# Patient Record
Sex: FEMALE | Race: WHITE | NOT HISPANIC OR LATINO | Employment: OTHER | URBAN - METROPOLITAN AREA
[De-identification: names, ages, dates, MRNs, and addresses within clinical notes are randomized per-mention and may not be internally consistent; named-entity substitution may affect disease eponyms.]

---

## 2020-07-30 ENCOUNTER — OFFICE VISIT (OUTPATIENT)
Dept: CARDIOLOGY | Facility: CLINIC | Age: 75
End: 2020-07-30

## 2020-07-30 VITALS
BODY MASS INDEX: 24.91 KG/M2 | DIASTOLIC BLOOD PRESSURE: 55 MMHG | HEART RATE: 82 BPM | SYSTOLIC BLOOD PRESSURE: 98 MMHG | HEIGHT: 66 IN | OXYGEN SATURATION: 100 % | WEIGHT: 155 LBS

## 2020-07-30 DIAGNOSIS — E11.9 TYPE 2 DIABETES MELLITUS WITHOUT COMPLICATION, WITHOUT LONG-TERM CURRENT USE OF INSULIN (HCC): ICD-10-CM

## 2020-07-30 DIAGNOSIS — I25.10 CORONARY ARTERY DISEASE INVOLVING NATIVE CORONARY ARTERY OF NATIVE HEART WITHOUT ANGINA PECTORIS: Primary | ICD-10-CM

## 2020-07-30 DIAGNOSIS — E78.2 MIXED HYPERLIPIDEMIA: ICD-10-CM

## 2020-07-30 PROCEDURE — 99204 OFFICE O/P NEW MOD 45 MIN: CPT | Performed by: INTERNAL MEDICINE

## 2020-07-31 RX ORDER — NITROGLYCERIN 0.4 MG/1
0.4 TABLET SUBLINGUAL
COMMUNITY

## 2020-07-31 RX ORDER — PAROXETINE 30 MG/1
30 TABLET, FILM COATED ORAL EVERY MORNING
COMMUNITY
End: 2020-08-24 | Stop reason: SDUPTHER

## 2020-07-31 RX ORDER — ROSUVASTATIN CALCIUM 20 MG/1
40 TABLET, COATED ORAL DAILY
COMMUNITY

## 2020-07-31 RX ORDER — CARVEDILOL 12.5 MG/1
12.5 TABLET ORAL 3 TIMES DAILY
COMMUNITY
End: 2020-10-07 | Stop reason: SDUPTHER

## 2020-07-31 RX ORDER — CLOPIDOGREL BISULFATE 75 MG/1
75 TABLET ORAL DAILY
COMMUNITY
End: 2020-10-07 | Stop reason: SDUPTHER

## 2020-07-31 RX ORDER — OXYBUTYNIN CHLORIDE 5 MG/1
10 TABLET ORAL 2 TIMES DAILY
COMMUNITY
End: 2020-08-27 | Stop reason: ALTCHOICE

## 2020-08-04 ENCOUNTER — OFFICE VISIT (OUTPATIENT)
Dept: FAMILY MEDICINE CLINIC | Facility: CLINIC | Age: 75
End: 2020-08-04

## 2020-08-04 VITALS
HEIGHT: 66 IN | WEIGHT: 155 LBS | BODY MASS INDEX: 24.91 KG/M2 | HEART RATE: 80 BPM | SYSTOLIC BLOOD PRESSURE: 97 MMHG | DIASTOLIC BLOOD PRESSURE: 56 MMHG | OXYGEN SATURATION: 97 % | TEMPERATURE: 97.1 F

## 2020-08-04 DIAGNOSIS — L08.9 TOE INFECTION: ICD-10-CM

## 2020-08-04 DIAGNOSIS — E11.42 TYPE 2 DIABETES MELLITUS WITH DIABETIC POLYNEUROPATHY, WITHOUT LONG-TERM CURRENT USE OF INSULIN (HCC): ICD-10-CM

## 2020-08-04 DIAGNOSIS — E78.2 MIXED HYPERLIPIDEMIA: Primary | ICD-10-CM

## 2020-08-04 DIAGNOSIS — Z12.31 OTHER SCREENING MAMMOGRAM: ICD-10-CM

## 2020-08-04 DIAGNOSIS — Z12.11 SCREEN FOR COLON CANCER: ICD-10-CM

## 2020-08-04 DIAGNOSIS — E03.9 ACQUIRED HYPOTHYROIDISM: ICD-10-CM

## 2020-08-04 DIAGNOSIS — R32 URINARY INCONTINENCE, UNSPECIFIED TYPE: ICD-10-CM

## 2020-08-04 DIAGNOSIS — Z00.00 PREVENTATIVE HEALTH CARE: ICD-10-CM

## 2020-08-04 PROBLEM — F41.8 DEPRESSION WITH ANXIETY: Status: ACTIVE | Noted: 2020-08-04

## 2020-08-04 PROBLEM — Z87.442 HISTORY OF KIDNEY STONES: Status: ACTIVE | Noted: 2020-08-04

## 2020-08-04 PROCEDURE — 99204 OFFICE O/P NEW MOD 45 MIN: CPT | Performed by: NURSE PRACTITIONER

## 2020-08-04 RX ORDER — ALENDRONATE SODIUM 70 MG/1
70 TABLET ORAL
Qty: 4 TABLET | Refills: 6 | Status: SHIPPED | OUTPATIENT
Start: 2020-08-04 | End: 2020-09-14 | Stop reason: SDUPTHER

## 2020-08-04 NOTE — PATIENT INSTRUCTIONS
Fasting blood work  Keep appointment for colonoscopy and mammogram  Take Fosamax as directed  Monitor blood sugars  Bladder spasm medicine as directed  Take antibiotic as directed with food until gone  Keep appointment with podiatry  Follow-up 1 month

## 2020-08-04 NOTE — PROGRESS NOTES
Edin Brown is a 74 y.o. female.     74-year-old white female with history of CAD/CABG/angioplasty, depression anxiety, type 2 diabetes, hyperlipidemia, kidney stones, hypothyroidism, and urinary incontinence who comes in today to establish as new patient.  Patient currently has moved here from [place] is living with her daughter  Her main complaint today is right greater toe is very red and oozing discharge.  I am placing her on clindamycin and sending her to podiatry.  All her toenails need to be addressed  Blood pressure low at 98/56 heart rate 80 with murmur she has been established with cardiology she saw  recently and he was not sure what was causing her pressure to run low however he did not decrease any of her medications  Patient states sugars usually average less than 130 fasting in the morning and we will be getting blood work  Does have a history of depression anxiety which is very well controlled and patient states she has no issues with either at this time  She does complain of lower thoracic pain that radiates around to the front of her abdomen that comes and goes.  She states she has had thoracic spine series done that did not show any issues.  It is possible may be a kidney stone moving  Weight 155  I gave patient some samples of Myrbetriq to try for her urinary incontinence and we may do urology referral down the road  Patient states she had a recent DEXA scan which showed osteopenia she is going to try to find out exactly when it was I am placing her on Fosamax and calcium vitamin D.  I am also scheduling her colonoscopy and mammogram and she is to schedule an eye exam    Podiatry referral  Clindamycin 3 mg twice daily x10 days  Mammogram/colonoscopy  Schedule eye exam  Fasting blood work  Myrbetriq 50 mg daily samples  Follow-up 1 month       The following portions of the patient's history were reviewed and updated as appropriate: allergies, current medications, past family  "history, past medical history, past social history, past surgical history and problem list.    Vitals:    08/04/20 1516   BP: 97/56   BP Location: Right arm   Patient Position: Sitting   Cuff Size: Adult   Pulse: 80   Temp: 97.1 °F (36.2 °C)   TempSrc: Temporal   SpO2: 97%   Weight: 70.3 kg (155 lb)   Height: 167.6 cm (66\")     Body mass index is 25.02 kg/m².    Past Medical History:   Diagnosis Date   • Coronary artery disease    • Diabetes mellitus (CMS/[facility])    • Mixed hyperlipidemia 7/30/2020     Past Surgical History:   Procedure Laterality Date   • CARDIAC CATHETERIZATION     • CORONARY ARTERY BYPASS GRAFT     • CORONARY STENT PLACEMENT     • DILATATION AND CURETTAGE     • HYSTERECTOMY     • TONSILLECTOMY     • TONSILLECTOMY       Family History   Problem Relation Age of Onset   • Diabetes Mother    • No Known Problems Father    • No Known Problems Sister    • Heart attack Brother    • Heart disease Brother    • Heart failure Brother    • No Known Problems Maternal Aunt    • No Known Problems Maternal Uncle    • No Known Problems Paternal Aunt    • No Known Problems Paternal Uncle    • No Known Problems Maternal Grandmother    • No Known Problems Maternal Grandfather    • No Known Problems Paternal Grandmother    • No Known Problems Paternal Grandfather    • No Known Problems Other    • Anemia Neg Hx    • Arrhythmia Neg Hx    • Asthma Neg Hx    • Clotting disorder Neg Hx    • Fainting Neg Hx    • Hyperlipidemia Neg Hx    • Hypertension Neg Hx        There is no immunization history on file for this patient.    No results found for any previous visit.         Review of Systems   Constitutional: Negative.    HENT: Negative.    Respiratory: Negative.    Cardiovascular: Negative.    Gastrointestinal: Negative.    Genitourinary: Positive for urinary incontinence.   Musculoskeletal:        Right greater toe pain   Psychiatric/Behavioral: Negative.        Objective   Physical Exam   Constitutional: She is oriented to " person, place, and time. She appears well-developed and well-nourished.   Cardiovascular: Normal rate and regular rhythm.   Murmur heard.  Pulmonary/Chest: Effort normal and breath sounds normal.   Abdominal: Soft. Bowel sounds are normal.   Musculoskeletal: Normal range of motion.   Right greater toe red swollen and oozing drainage and very sore   Neurological: She is alert and oriented to person, place, and time.   Skin: Skin is warm and dry.   Psychiatric: She has a normal mood and affect.       Procedures    Assessment/Plan   Edin was seen today for toe pain.    Diagnoses and all orders for this visit:    Mixed hyperlipidemia  -     Lipid Panel With LDL / HDL Ratio    Acquired hypothyroidism  -     TSH+Free T4  -     T3    Type 2 diabetes mellitus with diabetic polyneuropathy, without long-term current use of insulin (CMS/[facility])  -     Comprehensive Metabolic Panel  -     Hemoglobin A1c    Preventative health care  -     CBC & Differential    Screen for colon cancer  -     Amb referral for Screening Colonoscopy    Toe infection    Urinary incontinence, unspecified type    BMI 25.0-25.9,adult    Other orders  -     alendronate (FOSAMAX) 70 MG tablet; Take 1 tablet by mouth Every 7 (Seven) Days.          Current Outpatient Medications:   •  carvedilol (COREG) 12.5 MG tablet, Take 12.5 mg by mouth 2 (Two) Times a Day With Meals., Disp: , Rfl:   •  clopidogrel (PLAVIX) 75 MG tablet, Take 75 mg by mouth Daily., Disp: , Rfl:   •  insulin detemir (LEVEMIR) 100 UNIT/ML injection, Inject 20 Units under the skin into the appropriate area as directed Daily., Disp: , Rfl:   •  metFORMIN (GLUCOPHAGE) 1000 MG tablet, Take 1,000 mg by mouth 2 (Two) Times a Day With Meals., Disp: , Rfl:   •  nitroglycerin (NITROSTAT) 0.4 MG SL tablet, Place 0.4 mg under the tongue Every 5 (Five) Minutes As Needed for Chest Pain. Take no more than 3 doses in 15 minutes., Disp: , Rfl:   •  oxybutynin (DITROPAN) 5 MG tablet, Take 10 mg by mouth 2  (Two) Times a Day., Disp: , Rfl:   •  PARoxetine (PAXIL) 30 MG tablet, Take 30 mg by mouth Every Morning., Disp: , Rfl:   •  rosuvastatin (CRESTOR) 20 MG tablet, Take 40 mg by mouth Daily., Disp: , Rfl:   •  alendronate (FOSAMAX) 70 MG tablet, Take 1 tablet by mouth Every 7 (Seven) Days., Disp: 4 tablet, Rfl: 6

## 2020-08-05 NOTE — ADDENDUM NOTE
Addended by: STEFANIA LEDESMA on: 8/5/2020 04:04 PM     Modules accepted: Orders     Spl[place] 275 mg/1.1 m    Administered : left deltoid  JJ  EXP: 2022  NDC: 89548-005-68  Patient tolerated well

## 2020-08-06 ENCOUNTER — TELEPHONE (OUTPATIENT)
Dept: FAMILY MEDICINE CLINIC | Facility: CLINIC | Age: 75
End: 2020-08-06

## 2020-08-06 NOTE — TELEPHONE ENCOUNTER
Pt called confused on the instructions for Myrbetriq 50 mg.  Pt says she is struggling to read the instructions.

## 2020-08-07 LAB
ALBUMIN SERPL-MCNC: 3.8 G/DL (ref 3.7–4.7)
ALBUMIN/GLOB SERPL: 1.7 {RATIO} (ref 1.2–2.2)
ALP SERPL-CCNC: 126 IU/L (ref 39–117)
ALT SERPL-CCNC: 10 IU/L (ref 0–32)
AST SERPL-CCNC: 20 IU/L (ref 0–40)
BASOPHILS # BLD AUTO: 0 X10E3/UL (ref 0–0.2)
BASOPHILS NFR BLD AUTO: 1 %
BILIRUB SERPL-MCNC: <0.2 MG/DL (ref 0–1.2)
BUN SERPL-MCNC: 25 MG/DL (ref 8–27)
BUN/CREAT SERPL: 29 (ref 12–28)
CALCIUM SERPL-MCNC: 9.1 MG/DL (ref 8.7–10.3)
CHLORIDE SERPL-SCNC: 106 MMOL/L (ref 96–106)
CHOLEST SERPL-MCNC: 99 MG/DL (ref 100–199)
CO2 SERPL-SCNC: 28 MMOL/L (ref 20–29)
CREAT SERPL-MCNC: 0.87 MG/DL (ref 0.57–1)
EOSINOPHIL # BLD AUTO: 0.2 X10E3/UL (ref 0–0.4)
EOSINOPHIL NFR BLD AUTO: 4 %
ERYTHROCYTE [DISTWIDTH] IN BLOOD BY AUTOMATED COUNT: 13.6 % (ref 11.7–15.4)
GLOBULIN SER CALC-MCNC: 2.3 G/DL (ref 1.5–4.5)
GLUCOSE SERPL-MCNC: 103 MG/DL (ref 65–99)
HBA1C MFR BLD: 6.6 % (ref 4.8–5.6)
HCT VFR BLD AUTO: 31.9 % (ref 34–46.6)
HDLC SERPL-MCNC: 34 MG/DL
HGB BLD-MCNC: 10 G/DL (ref 11.1–15.9)
IMM GRANULOCYTES # BLD AUTO: 0 X10E3/UL (ref 0–0.1)
IMM GRANULOCYTES NFR BLD AUTO: 0 %
LDLC SERPL CALC-MCNC: 42 MG/DL (ref 0–99)
LDLC/HDLC SERPL: 1.2 RATIO (ref 0–3.2)
LYMPHOCYTES # BLD AUTO: 1.2 X10E3/UL (ref 0.7–3.1)
LYMPHOCYTES NFR BLD AUTO: 22 %
MCH RBC QN AUTO: 27.9 PG (ref 26.6–33)
MCHC RBC AUTO-ENTMCNC: 31.3 G/DL (ref 31.5–35.7)
MCV RBC AUTO: 89 FL (ref 79–97)
MONOCYTES # BLD AUTO: 0.5 X10E3/UL (ref 0.1–0.9)
MONOCYTES NFR BLD AUTO: 9 %
NEUTROPHILS # BLD AUTO: 3.5 X10E3/UL (ref 1.4–7)
NEUTROPHILS NFR BLD AUTO: 64 %
PLATELET # BLD AUTO: 116 X10E3/UL (ref 150–450)
POTASSIUM SERPL-SCNC: 4.1 MMOL/L (ref 3.5–5.2)
PROT SERPL-MCNC: 6.1 G/DL (ref 6–8.5)
RBC # BLD AUTO: 3.59 X10E6/UL (ref 3.77–5.28)
SODIUM SERPL-SCNC: 144 MMOL/L (ref 134–144)
T3 SERPL-MCNC: 95 NG/DL (ref 71–180)
T4 FREE SERPL-MCNC: 1.07 NG/DL (ref 0.82–1.77)
TRIGL SERPL-MCNC: 115 MG/DL (ref 0–149)
TSH SERPL DL<=0.005 MIU/L-ACNC: 4.89 UIU/ML (ref 0.45–4.5)
VLDLC SERPL CALC-MCNC: 23 MG/DL (ref 5–40)
WBC # BLD AUTO: 5.5 X10E3/UL (ref 3.4–10.8)

## 2020-08-10 ENCOUNTER — OFFICE VISIT (OUTPATIENT)
Dept: FAMILY MEDICINE CLINIC | Facility: CLINIC | Age: 75
End: 2020-08-10

## 2020-08-10 VITALS
HEART RATE: 89 BPM | WEIGHT: 154 LBS | HEIGHT: 66 IN | OXYGEN SATURATION: 97 % | TEMPERATURE: 97.6 F | SYSTOLIC BLOOD PRESSURE: 97 MMHG | DIASTOLIC BLOOD PRESSURE: 60 MMHG | BODY MASS INDEX: 24.75 KG/M2

## 2020-08-10 DIAGNOSIS — R10.9 FLANK PAIN: ICD-10-CM

## 2020-08-10 DIAGNOSIS — D50.9 IRON DEFICIENCY ANEMIA, UNSPECIFIED IRON DEFICIENCY ANEMIA TYPE: ICD-10-CM

## 2020-08-10 DIAGNOSIS — E11.43 TYPE 2 DIABETES MELLITUS WITH DIABETIC AUTONOMIC NEUROPATHY, WITH LONG-TERM CURRENT USE OF INSULIN (HCC): ICD-10-CM

## 2020-08-10 DIAGNOSIS — Z87.442 HISTORY OF KIDNEY STONES: ICD-10-CM

## 2020-08-10 DIAGNOSIS — Z79.4 TYPE 2 DIABETES MELLITUS WITH DIABETIC AUTONOMIC NEUROPATHY, WITH LONG-TERM CURRENT USE OF INSULIN (HCC): ICD-10-CM

## 2020-08-10 DIAGNOSIS — E03.9 HYPOTHYROIDISM, UNSPECIFIED TYPE: ICD-10-CM

## 2020-08-10 DIAGNOSIS — E78.2 MIXED HYPERLIPIDEMIA: ICD-10-CM

## 2020-08-10 DIAGNOSIS — N39.41 URGE INCONTINENCE OF URINE: Primary | ICD-10-CM

## 2020-08-10 LAB
BILIRUB BLD-MCNC: NEGATIVE MG/DL
CLARITY, POC: CLEAR
COLOR UR: YELLOW
GLUCOSE UR STRIP-MCNC: ABNORMAL MG/DL
KETONES UR QL: NEGATIVE
LEUKOCYTE EST, POC: NEGATIVE
NITRITE UR-MCNC: NEGATIVE MG/ML
PH UR: 6 [PH] (ref 5–8)
PROT UR STRIP-MCNC: ABNORMAL MG/DL
RBC # UR STRIP: NEGATIVE /UL
SP GR UR: 1.01 (ref 1–1.03)
UROBILINOGEN UR QL: NORMAL

## 2020-08-10 PROCEDURE — 99214 OFFICE O/P EST MOD 30 MIN: CPT | Performed by: NURSE PRACTITIONER

## 2020-08-10 PROCEDURE — 81003 URINALYSIS AUTO W/O SCOPE: CPT | Performed by: NURSE PRACTITIONER

## 2020-08-10 RX ORDER — FERROUS SULFATE 324(65)MG
324 TABLET, DELAYED RELEASE (ENTERIC COATED) ORAL
Qty: 30 TABLET | Refills: 6 | Status: SHIPPED | OUTPATIENT
Start: 2020-08-10

## 2020-08-10 RX ORDER — PEN NEEDLE, DIABETIC 30 GX3/16"
1 NEEDLE, DISPOSABLE MISCELLANEOUS DAILY
Qty: 50 EACH | Refills: 5 | Status: SHIPPED | OUTPATIENT
Start: 2020-08-10 | End: 2020-09-28

## 2020-08-10 RX ORDER — LEVOTHYROXINE SODIUM 0.05 MG/1
50 TABLET ORAL DAILY
Qty: 30 TABLET | Refills: 2 | Status: SHIPPED | OUTPATIENT
Start: 2020-08-10 | End: 2020-09-14 | Stop reason: SDUPTHER

## 2020-08-10 RX ORDER — LEVOTHYROXINE SODIUM 0.03 MG/1
25 TABLET ORAL DAILY
Qty: 30 TABLET | Refills: 2 | Status: SHIPPED | OUTPATIENT
Start: 2020-08-10 | End: 2020-08-10 | Stop reason: ALTCHOICE

## 2020-08-10 RX ORDER — GLIMEPIRIDE 2 MG/1
2 TABLET ORAL
Qty: 60 TABLET | Refills: 2 | Status: SHIPPED | OUTPATIENT
Start: 2020-08-10 | End: 2020-10-07 | Stop reason: SDUPTHER

## 2020-08-10 NOTE — PATIENT INSTRUCTIONS
Stop Levemir  Amaryl 2 mg in the morning along with metformin call in blood sugars to office  Ferrous sulfate 325 mg daily  Keep appointment for CT  Take increased dose of Synthroid as directed  Repeat fasting blood work 3 months

## 2020-08-10 NOTE — PROGRESS NOTES
"    Edin Brown is a 74 y.o. female.     74-year-old white female with history of CAD/CABG/angioplasty, depression anxiety, type 2 diabetes, hyperlipidemia, kidney stones, hypothyroidism, urinary incontinence who comes in today for follow-up visit to go over blood work  Patient's thyroid level was elevated we are going to increase her Synthroid to 50mcg and recheck levels in 3 months  She was also anemic at 10.4 but  states she has not had anemia for a long time but she is aware of put her on iron supplements and we will recheck her CBC with her thyroid levels in 3 months  Patient sugars normally average less than 120 in the morning she is on Levemir and metformin.  She would like to get off the Levemir because it is expensive I am going to switch her over to Amaryl and metformin and will see what her sugars do off of the Levemir  Blood pressure 98/60 heart rate 88 she denies any chest pain, dyspnea, tachycardia or dizziness  Patient still having episodes starting in the left flank radiating around to the front.  She does have a history of stones and states that they were checking her gallbladder in [place] before she left that showed no gallstones.  I am going to get a CT stone protocol her urine today did not have blood    CT stone protocol  Ferrous sulfate 325 mg daily  Amaryl 2 mg daily along with metformin  Stop Levemir  Increase Synthroid to 50mcg daily  Repeat fasting blood work 3 months       The following portions of the patient's history were reviewed and updated as appropriate: allergies, current medications, past family history, past medical history, past social history, past surgical history and problem list.    Vitals:    08/10/20 1511   BP: 97/60   BP Location: Right arm   Patient Position: Sitting   Cuff Size: Adult   Pulse: 89   Temp: 97.6 °F (36.4 °C)   TempSrc: Temporal   SpO2: 97%   Weight: 69.9 kg (154 lb)   Height: 167.6 cm (66\")     Body mass index is 24.86 kg/m².    Past Medical " History:   Diagnosis Date   • Coronary artery disease    • Diabetes mellitus (CMS/[facility])    • Mixed hyperlipidemia 7/30/2020     Past Surgical History:   Procedure Laterality Date   • CARDIAC CATHETERIZATION     • CORONARY ARTERY BYPASS GRAFT     • CORONARY STENT PLACEMENT     • DILATATION AND CURETTAGE     • HYSTERECTOMY     • TONSILLECTOMY     • TONSILLECTOMY       Family History   Problem Relation Age of Onset   • Diabetes Mother    • No Known Problems Father    • No Known Problems Sister    • Heart attack Brother    • Heart disease Brother    • Heart failure Brother    • No Known Problems Maternal Aunt    • No Known Problems Maternal Uncle    • No Known Problems Paternal Aunt    • No Known Problems Paternal Uncle    • No Known Problems Maternal Grandmother    • No Known Problems Maternal Grandfather    • No Known Problems Paternal Grandmother    • No Known Problems Paternal Grandfather    • No Known Problems Other    • Anemia Neg Hx    • Arrhythmia Neg Hx    • Asthma Neg Hx    • Clotting disorder Neg Hx    • Fainting Neg Hx    • Hyperlipidemia Neg Hx    • Hypertension Neg Hx        There is no immunization history on file for this patient.    Office Visit on 08/04/2020   Component Date Value Ref Range Status   • WBC 08/06/2020 5.5  3.4 - 10.8 x10E3/uL Final   • RBC 08/06/2020 3.59* 3.77 - 5.28 x10E6/uL Final   • Hemoglobin 08/06/2020 10.0* 11.1 - 15.9 g/dL Final   • Hematocrit 08/06/2020 31.9* 34.0 - 46.6 % Final   • MCV 08/06/2020 89  79 - 97 fL Final   • MCH 08/06/2020 27.9  26.6 - 33.0 pg Final   • MCHC 08/06/2020 31.3* 31.5 - 35.7 g/dL Final   • RDW 08/06/2020 13.6  11.7 - 15.4 % Final   • Platelets 08/06/2020 116* 150 - 450 x10E3/uL Final   • Neutrophil Rel % 08/06/2020 64  Not Estab. % Final   • Lymphocyte Rel % 08/06/2020 22  Not Estab. % Final   • Monocyte Rel % 08/06/2020 9  Not Estab. % Final   • Eosinophil Rel % 08/06/2020 4  Not Estab. % Final   • Basophil Rel % 08/06/2020 1  Not Estab. % Final   •  Neutrophils Absolute 08/06/2020 3.5  1.4 - 7.0 x10E3/uL Final   • Lymphocytes Absolute 08/06/2020 1.2  0.7 - 3.1 x10E3/uL Final   • Monocytes Absolute 08/06/2020 0.5  0.1 - 0.9 x10E3/uL Final   • Eosinophils Absolute 08/06/2020 0.2  0.0 - 0.4 x10E3/uL Final   • Basophils Absolute 08/06/2020 0.0  0.0 - 0.2 x10E3/uL Final   • Immature Granulocyte Rel % 08/06/2020 0  Not Estab. % Final   • Immature Grans Absolute 08/06/2020 0.0  0.0 - 0.1 x10E3/uL Final   • Glucose 08/06/2020 103* 65 - 99 mg/dL Final   • BUN 08/06/2020 25  8 - 27 mg/dL Final   • Creatinine 08/06/2020 0.87  0.57 - 1.00 mg/dL Final   • eGFR Non  Am 08/06/2020 66  >59 mL/min/1.73 Final   • eGFR African Am 08/06/2020 76  >59 mL/min/1.73 Final   • BUN/Creatinine Ratio 08/06/2020 29* 12 - 28 Final   • Sodium 08/06/2020 144  134 - 144 mmol/L Final   • Potassium 08/06/2020 4.1  3.5 - 5.2 mmol/L Final   • Chloride 08/06/2020 106  96 - 106 mmol/L Final   • Total CO2 08/06/2020 28  20 - 29 mmol/L Final   • Calcium 08/06/2020 9.1  8.7 - 10.3 mg/dL Final   • Total Protein 08/06/2020 6.1  6.0 - 8.5 g/dL Final   • Albumin 08/06/2020 3.8  3.7 - 4.7 g/dL Final   • Globulin 08/06/2020 2.3  1.5 - 4.5 g/dL Final   • A/G Ratio 08/06/2020 1.7  1.2 - 2.2 Final   • Total Bilirubin 08/06/2020 <0.2  0.0 - 1.2 mg/dL Final   • Alkaline Phosphatase 08/06/2020 126* 39 - 117 IU/L Final   • AST (SGOT) 08/06/2020 20  0 - 40 IU/L Final   • ALT (SGPT) 08/06/2020 10  0 - 32 IU/L Final   • Total Cholesterol 08/06/2020 99* 100 - 199 mg/dL Final   • Triglycerides 08/06/2020 115  0 - 149 mg/dL Final   • HDL Cholesterol 08/06/2020 34* >39 mg/dL Final   • VLDL Cholesterol 08/06/2020 23  5 - 40 mg/dL Final   • LDL Cholesterol  08/06/2020 42  0 - 99 mg/dL Final   • LDL/HDL Ratio 08/06/2020 1.2  0.0 - 3.2 ratio Final    Comment:                                     LDL/HDL Ratio                                              Men  Women                                1/2 Avg.Risk  1.0    1.5                                     Avg.Risk  3.6    3.2                                 2X Avg.Risk  6.2    5.0                                 3X Avg.Risk  8.0    6.1     • Hemoglobin A1C 08/06/2020 6.6* 4.8 - 5.6 % Final    Comment:          Prediabetes: 5.7 - 6.4           Diabetes: >6.4           Glycemic control for adults with diabetes: <7.0     • TSH 08/06/2020 4.890* 0.450 - 4.500 uIU/mL Final   • Free T4 08/06/2020 1.07  0.82 - 1.77 ng/dL Final   • T3, Total 08/06/2020 95  71 - 180 ng/dL Final         Review of Systems   Constitutional: Negative.    HENT: Negative.    Respiratory: Negative.    Cardiovascular: Negative.    Gastrointestinal: Positive for abdominal pain.   Genitourinary: Positive for flank pain.   Skin: Negative.    Neurological: Negative.    Psychiatric/Behavioral: Negative.        Objective   Physical Exam   Constitutional: She is oriented to person, place, and time. She appears well-developed and well-nourished.   Cardiovascular: Normal rate and regular rhythm.   Pulmonary/Chest: Effort normal and breath sounds normal.   Abdominal: Soft. Bowel sounds are normal.   Musculoskeletal: Normal range of motion.   Neurological: She is alert and oriented to person, place, and time.   Skin: Skin is warm and dry.   Psychiatric: She has a normal mood and affect.       Procedures    Assessment/Plan   Edin was seen today for urinary tract infection and toe pain.    Diagnoses and all orders for this visit:    Urge incontinence of urine  -     POCT urinalysis dipstick, automated    Mixed hyperlipidemia    Iron deficiency anemia, unspecified iron deficiency anemia type  -     ferrous sulfate 324 MG tablet delayed-release; Take 1 tablet by mouth Daily With Breakfast.    Hypothyroidism, unspecified type  -     levothyroxine (Synthroid) 50 MCG tablet; Take 1 tablet by mouth Daily.    Type 2 diabetes mellitus with diabetic autonomic neuropathy, with long-term current use of insulin (CMS/[facility])  -     glimepiride  (AMARYL) 2 MG tablet; Take 1 tablet by mouth Every Morning Before Breakfast.    History of kidney stones    BMI 24.0-24.9, adult    Other orders  -     Mirabegron ER (MYRBETRIQ) 50 MG tablet sustained-release 24 hour 24 hr tablet; Take 50 mg by mouth Daily.  -     Insulin Pen Needle (PEN NEEDLES) 32G X 4 MM misc; 1 each Daily. Use as directed with insulin          Current Outpatient Medications:   •  alendronate (FOSAMAX) 70 MG tablet, Take 1 tablet by mouth Every 7 (Seven) Days., Disp: 4 tablet, Rfl: 6  •  carvedilol (COREG) 12.5 MG tablet, Take 12.5 mg by mouth 2 (Two) Times a Day With Meals., Disp: , Rfl:   •  clopidogrel (PLAVIX) 75 MG tablet, Take 75 mg by mouth Daily., Disp: , Rfl:   •  insulin detemir (LEVEMIR) 100 UNIT/ML injection, Inject 20 Units under the skin into the appropriate area as directed Daily., Disp: , Rfl:   •  metFORMIN (GLUCOPHAGE) 1000 MG tablet, Take 1,000 mg by mouth 2 (Two) Times a Day With Meals., Disp: , Rfl:   •  nitroglycerin (NITROSTAT) 0.4 MG SL tablet, Place 0.4 mg under the tongue Every 5 (Five) Minutes As Needed for Chest Pain. Take no more than 3 doses in 15 minutes., Disp: , Rfl:   •  oxybutynin (DITROPAN) 5 MG tablet, Take 10 mg by mouth 2 (Two) Times a Day., Disp: , Rfl:   •  PARoxetine (PAXIL) 30 MG tablet, Take 30 mg by mouth Every Morning., Disp: , Rfl:   •  rosuvastatin (CRESTOR) 20 MG tablet, Take 40 mg by mouth Daily., Disp: , Rfl:   •  ferrous sulfate 324 MG tablet delayed-release, Take 1 tablet by mouth Daily With Breakfast., Disp: 30 tablet, Rfl: 6  •  glimepiride (AMARYL) 2 MG tablet, Take 1 tablet by mouth Every Morning Before Breakfast., Disp: 60 tablet, Rfl: 2  •  Insulin Pen Needle (PEN NEEDLES) 32G X 4 MM misc, 1 each Daily. Use as directed with insulin, Disp: 50 each, Rfl: 5  •  levothyroxine (Synthroid) 50 MCG tablet, Take 1 tablet by mouth Daily., Disp: 30 tablet, Rfl: 2  •  Mirabegron ER (MYRBETRIQ) 50 MG tablet sustained-release 24 hour 24 hr tablet, Take  50 mg by mouth Daily., Disp: 30 tablet, Rfl: 6

## 2020-08-11 ENCOUNTER — TELEPHONE (OUTPATIENT)
Dept: FAMILY MEDICINE CLINIC | Facility: CLINIC | Age: 75
End: 2020-08-11

## 2020-08-11 NOTE — TELEPHONE ENCOUNTER
Patient reported her last pneumonia vaccine was in 2/2015 with Dr. Kincaid in [facility].  She is believing she needs a second vaccine.  Please call patient and let her know.

## 2020-08-18 DIAGNOSIS — R10.9 FLANK PAIN: ICD-10-CM

## 2020-08-18 DIAGNOSIS — Z87.442 HISTORY OF KIDNEY STONES: ICD-10-CM

## 2020-08-20 ENCOUNTER — TELEPHONE (OUTPATIENT)
Dept: FAMILY MEDICINE CLINIC | Facility: CLINIC | Age: 75
End: 2020-08-20

## 2020-08-20 NOTE — TELEPHONE ENCOUNTER
It is amaryl and it was called in. She is to stop levamir and start amaryl then calll in blood pressures

## 2020-08-20 NOTE — TELEPHONE ENCOUNTER
Patient called and stated you was changing her from Levemir to a different diabetes medication, patient stated will be out of Levemir today and wanted to know if you can go ahead and send in the new medication to [facility] in [place]

## 2020-08-24 ENCOUNTER — TELEPHONE (OUTPATIENT)
Dept: FAMILY MEDICINE CLINIC | Facility: CLINIC | Age: 75
End: 2020-08-24

## 2020-08-24 DIAGNOSIS — Z12.31 OTHER SCREENING MAMMOGRAM: ICD-10-CM

## 2020-08-24 DIAGNOSIS — I25.10 CORONARY ARTERY DISEASE INVOLVING NATIVE CORONARY ARTERY OF NATIVE HEART WITHOUT ANGINA PECTORIS: Primary | ICD-10-CM

## 2020-08-24 RX ORDER — PAROXETINE 30 MG/1
30 TABLET, FILM COATED ORAL EVERY MORNING
Qty: 90 TABLET | Refills: 3 | Status: SHIPPED | OUTPATIENT
Start: 2020-08-24

## 2020-08-24 NOTE — TELEPHONE ENCOUNTER
Pt called and states she doesn't want to see cardio Dr. Ramirez. She wants to see who you recommend. It it ok to order new referral and send to whoever you wish? Please advise.

## 2020-08-27 ENCOUNTER — OFFICE VISIT (OUTPATIENT)
Dept: FAMILY MEDICINE CLINIC | Facility: CLINIC | Age: 75
End: 2020-08-27

## 2020-08-27 ENCOUNTER — TELEPHONE (OUTPATIENT)
Dept: FAMILY MEDICINE CLINIC | Facility: CLINIC | Age: 75
End: 2020-08-27

## 2020-08-27 VITALS
OXYGEN SATURATION: 100 % | HEIGHT: 66 IN | HEART RATE: 83 BPM | SYSTOLIC BLOOD PRESSURE: 93 MMHG | TEMPERATURE: 96.8 F | WEIGHT: 157 LBS | BODY MASS INDEX: 25.23 KG/M2 | DIASTOLIC BLOOD PRESSURE: 56 MMHG

## 2020-08-27 DIAGNOSIS — N39.46 MIXED STRESS AND URGE URINARY INCONTINENCE: ICD-10-CM

## 2020-08-27 DIAGNOSIS — Z79.4 TYPE 2 DIABETES MELLITUS WITH DIABETIC AUTONOMIC NEUROPATHY, WITH LONG-TERM CURRENT USE OF INSULIN (HCC): Primary | ICD-10-CM

## 2020-08-27 DIAGNOSIS — I95.9 HYPOTENSION, UNSPECIFIED HYPOTENSION TYPE: ICD-10-CM

## 2020-08-27 DIAGNOSIS — E11.43 TYPE 2 DIABETES MELLITUS WITH DIABETIC AUTONOMIC NEUROPATHY, WITH LONG-TERM CURRENT USE OF INSULIN (HCC): Primary | ICD-10-CM

## 2020-08-27 LAB — GLUCOSE BLDC GLUCOMTR-MCNC: 187 MG/DL (ref 70–130)

## 2020-08-27 PROCEDURE — 99214 OFFICE O/P EST MOD 30 MIN: CPT | Performed by: NURSE PRACTITIONER

## 2020-08-27 PROCEDURE — 82962 GLUCOSE BLOOD TEST: CPT | Performed by: NURSE PRACTITIONER

## 2020-08-27 RX ORDER — DIPHENHYDRAMINE HYDROCHLORIDE 25 MG/1
CAPSULE, LIQUID FILLED ORAL
Qty: 1 EACH | Refills: 0 | Status: SHIPPED | OUTPATIENT
Start: 2020-08-27 | End: 2020-09-01 | Stop reason: SDUPTHER

## 2020-08-27 RX ORDER — OXYBUTYNIN CHLORIDE 5 MG/1
5 TABLET ORAL 2 TIMES DAILY
Qty: 60 TABLET | Refills: 0 | Status: SHIPPED | OUTPATIENT
Start: 2020-08-27 | End: 2020-09-28

## 2020-08-27 RX ORDER — LANCETS 30 GAUGE
1 EACH MISCELLANEOUS 3 TIMES DAILY
Qty: 100 EACH | Refills: 11 | Status: SHIPPED | OUTPATIENT
Start: 2020-08-27 | End: 2020-09-01 | Stop reason: SDUPTHER

## 2020-08-27 NOTE — PATIENT INSTRUCTIONS
Keep appointment with urologist  Monitor blood sugars and  new glucometer  Keep appointment with Dr. Cai  Follow-up CT abdomen in 6 months  Follow-up visit 3 months

## 2020-08-27 NOTE — PROGRESS NOTES
"    Edin Brown is a 74 y.o. female.     74-year-old white female with history of CAD/CABG/angioplasty, depression anxiety, type 2 diabetes, hyperlipidemia, kidney stones, hypothyroidism, urinary incontinence who comes in today for follow-up visit.  Patient states she went to the ER after her glucometer told her sugar was 800.  She states she did not feel any differently.  When she got to the ER her sugar was around 200.  Most glucometers will not read higher than 4 500 so I am thinking she probably read the meter wrong or it has malfunctioned.  I am ordering her a new glucometer.  She had a large breakfast this morning and states her sugar was in the 200s fasting before she ate breakfast.  Her fingerstick this morning was 183  Patient had asked to be switched to a different cardiologist and she has upcoming appointment with Dr. Cai.  Her blood pressures are always in the 90s  systolic but she is not symptomatic.  Blood pressure today 94/56 heart rate 82  Patient has overactive bladder and I placed her on Myrbetriq which has helped however we are going to refer her to urology she thinks her bladder has fallen  Weight is up 3 pounds at 157.  Patient CT of the abdomen showed some distention of aorta recommended 6-month follow-up    Keep appointment with cardiology  Neurology referral  Pickup new glucometer at [facility]  Follow-up 3 months         The following portions of the patient's history were reviewed and updated as appropriate: allergies, current medications, past family history, past medical history, past social history, past surgical history and problem list.    Vitals:    08/27/20 0909   BP: 93/56   BP Location: Right arm   Patient Position: Sitting   Cuff Size: Adult   Pulse: 83   Temp: 96.8 °F (36 °C)   TempSrc: Infrared   SpO2: 100%   Weight: 71.2 kg (157 lb)   Height: 167.6 cm (66\")     Body mass index is 25.34 kg/m².    Past Medical History:   Diagnosis Date   • Coronary artery disease    • Diabetes " mellitus (CMS/HCC)    • Mixed hyperlipidemia 7/30/2020     Past Surgical History:   Procedure Laterality Date   • CARDIAC CATHETERIZATION     • CORONARY ARTERY BYPASS GRAFT     • CORONARY STENT PLACEMENT     • DILATATION AND CURETTAGE     • HYSTERECTOMY     • TONSILLECTOMY     • TONSILLECTOMY       Family History   Problem Relation Age of Onset   • Diabetes Mother    • No Known Problems Father    • No Known Problems Sister    • Heart attack Brother    • Heart disease Brother    • Heart failure Brother    • No Known Problems Maternal Aunt    • No Known Problems Maternal Uncle    • No Known Problems Paternal Aunt    • No Known Problems Paternal Uncle    • No Known Problems Maternal Grandmother    • No Known Problems Maternal Grandfather    • No Known Problems Paternal Grandmother    • No Known Problems Paternal Grandfather    • No Known Problems Other    • Anemia Neg Hx    • Arrhythmia Neg Hx    • Asthma Neg Hx    • Clotting disorder Neg Hx    • Fainting Neg Hx    • Hyperlipidemia Neg Hx    • Hypertension Neg Hx        There is no immunization history on file for this patient.    Office Visit on 08/10/2020   Component Date Value Ref Range Status   • Color 08/10/2020 Yellow  Yellow, Straw, Dark Yellow, Ximena Final   • Clarity, UA 08/10/2020 Clear  Clear Final   • Specific Gravity  08/10/2020 1.015  1.005 - 1.030 Final   • pH, Urine 08/10/2020 6.0  5.0 - 8.0 Final   • Leukocytes 08/10/2020 Negative  Negative Final   • Nitrite, UA 08/10/2020 Negative  Negative Final   • Protein, POC 08/10/2020 Trace* Negative mg/dL Final   • Glucose, UA 08/10/2020 3+* Negative, 1000 mg/dL (3+) mg/dL Final   • Ketones, UA 08/10/2020 Negative  Negative Final   • Urobilinogen, UA 08/10/2020 Normal  Normal Final   • Bilirubin 08/10/2020 Negative  Negative Final   • Blood, UA 08/10/2020 Negative  Negative Final         Review of Systems   HENT: Negative.    Respiratory: Negative.    Cardiovascular: Negative.    Gastrointestinal: Negative.     Genitourinary: Negative.    Musculoskeletal: Negative.    Skin: Negative.    Neurological: Negative.    Psychiatric/Behavioral: Negative.        Objective   Physical Exam   Constitutional: She is oriented to person, place, and time. She appears well-developed and well-nourished.   Cardiovascular: Normal rate and regular rhythm.   Pulmonary/Chest: Effort normal and breath sounds normal.   Abdominal: Soft. Bowel sounds are normal.   Musculoskeletal: Normal range of motion.   Neurological: She is alert and oriented to person, place, and time.   Skin: Skin is warm and dry.   Psychiatric: She has a normal mood and affect.       Procedures    Assessment/Plan   Problems Addressed this Visit     None      Visit Diagnoses     Type 2 diabetes mellitus with diabetic autonomic neuropathy, with long-term current use of insulin (CMS/[facility])    -  Primary    Relevant Orders    POCT Glucose (Completed)    BMI 25.0-25.9,adult        Hypotension, unspecified hypotension type        Mixed stress and urge urinary incontinence        Relevant Orders    Ambulatory Referral to Urology (Completed)            Current Outpatient Medications:   •  alendronate (FOSAMAX) 70 MG tablet, Take 1 tablet by mouth Every 7 (Seven) Days., Disp: 4 tablet, Rfl: 6  •  carvedilol (COREG) 12.5 MG tablet, Take 12.5 mg by mouth 2 (Two) Times a Day With Meals., Disp: , Rfl:   •  clopidogrel (PLAVIX) 75 MG tablet, Take 75 mg by mouth Daily., Disp: , Rfl:   •  ferrous sulfate 324 MG tablet delayed-release, Take 1 tablet by mouth Daily With Breakfast., Disp: 30 tablet, Rfl: 6  •  glimepiride (AMARYL) 2 MG tablet, Take 1 tablet by mouth Every Morning Before Breakfast., Disp: 60 tablet, Rfl: 2  •  insulin detemir (LEVEMIR) 100 UNIT/ML injection, Inject 20 Units under the skin into the appropriate area as directed Daily., Disp: , Rfl:   •  Insulin Pen Needle (PEN NEEDLES) 32G X 4 MM misc, 1 each Daily. Use as directed with insulin, Disp: 50 each, Rfl: 5  •   levothyroxine (Synthroid) 50 MCG tablet, Take 1 tablet by mouth Daily., Disp: 30 tablet, Rfl: 2  •  metFORMIN (GLUCOPHAGE) 1000 MG tablet, Take 1,000 mg by mouth 2 (Two) Times a Day With Meals., Disp: , Rfl:   •  nitroglycerin (NITROSTAT) 0.4 MG SL tablet, Place 0.4 mg under the tongue Every 5 (Five) Minutes As Needed for Chest Pain. Take no more than 3 doses in 15 minutes., Disp: , Rfl:   •  oxybutynin (DITROPAN) 5 MG tablet, Take 1 tablet by mouth 2 (Two) Times a Day., Disp: 60 tablet, Rfl: 0  •  PARoxetine (PAXIL) 30 MG tablet, Take 1 tablet by mouth Every Morning., Disp: 90 tablet, Rfl: 3  •  rosuvastatin (CRESTOR) 20 MG tablet, Take 40 mg by mouth Daily., Disp: , Rfl:

## 2020-08-28 ENCOUNTER — TELEPHONE (OUTPATIENT)
Dept: FAMILY MEDICINE CLINIC | Facility: CLINIC | Age: 75
End: 2020-08-28

## 2020-09-01 RX ORDER — LANCETS 30 GAUGE
1 EACH MISCELLANEOUS 3 TIMES DAILY
Qty: 100 EACH | Refills: 11 | Status: SHIPPED | OUTPATIENT
Start: 2020-09-01 | End: 2020-09-08 | Stop reason: SDUPTHER

## 2020-09-01 RX ORDER — DIPHENHYDRAMINE HYDROCHLORIDE 25 MG/1
CAPSULE, LIQUID FILLED ORAL
Qty: 1 EACH | Refills: 0 | Status: SHIPPED | OUTPATIENT
Start: 2020-09-01 | End: 2020-09-08 | Stop reason: SDUPTHER

## 2020-09-03 ENCOUNTER — OFFICE VISIT (OUTPATIENT)
Dept: FAMILY MEDICINE CLINIC | Facility: CLINIC | Age: 75
End: 2020-09-03

## 2020-09-03 VITALS
DIASTOLIC BLOOD PRESSURE: 53 MMHG | TEMPERATURE: 99.3 F | HEIGHT: 66 IN | BODY MASS INDEX: 25.23 KG/M2 | SYSTOLIC BLOOD PRESSURE: 88 MMHG | OXYGEN SATURATION: 96 % | HEART RATE: 81 BPM | WEIGHT: 157 LBS

## 2020-09-03 DIAGNOSIS — E11.43 TYPE 2 DIABETES MELLITUS WITH DIABETIC AUTONOMIC NEUROPATHY, WITH LONG-TERM CURRENT USE OF INSULIN (HCC): ICD-10-CM

## 2020-09-03 DIAGNOSIS — I95.2 HYPOTENSION DUE TO DRUGS: Primary | ICD-10-CM

## 2020-09-03 DIAGNOSIS — Z79.4 TYPE 2 DIABETES MELLITUS WITH DIABETIC AUTONOMIC NEUROPATHY, WITH LONG-TERM CURRENT USE OF INSULIN (HCC): ICD-10-CM

## 2020-09-03 PROCEDURE — 99213 OFFICE O/P EST LOW 20 MIN: CPT | Performed by: NURSE PRACTITIONER

## 2020-09-03 RX ORDER — MIDODRINE HYDROCHLORIDE 5 MG/1
5 TABLET ORAL
Qty: 30 TABLET | Refills: 2 | Status: SHIPPED | OUTPATIENT
Start: 2020-09-03 | End: 2020-09-28

## 2020-09-03 NOTE — PROGRESS NOTES
"    Edin Brown is a 74 y.o. female.     74-year-old white female with history of CAD/CABG/angioplasty, depression, anxiety, type 2 diabetes, hyperlipidemia, kidney stones, hypothyroidism, urinary incontinence who comes in today for follow-up visit  Patient blood pressure still low at 88/52 heart rate 82 and she complains of weakness and some dizziness with position changes.  The only medicine she is on that can be causing this is carvedilol 12.5 twice a day however I hate to stop that or decrease it due to her atrial fibrillation.  I am going to put her on 5 mg of midodrine and she is going to try to get her systolic to be greater than 100 and see if she feels better.  She has an upcoming appointment with Dr. Cai in a couple of weeks  Weight is unchanged at 157 her sugars are averaging usually less than 150 in the morning depending on what she eats    Midodrine 5mg daily  Monitor blood pressure closely try to keep systolic above 100  Keep appointment with cardiology       The following portions of the patient's history were reviewed and updated as appropriate: allergies, current medications, past family history, past medical history, past social history, past surgical history and problem list.    Vitals:    09/03/20 1338   BP: (!) 88/53   BP Location: Right arm   Patient Position: Sitting   Cuff Size: Adult   Pulse: 81   Temp: 99.3 °F (37.4 °C)   TempSrc: Infrared   SpO2: 96%   Weight: 71.2 kg (157 lb)   Height: 167.6 cm (66\")     Body mass index is 25.34 kg/m².    Past Medical History:   Diagnosis Date   • Coronary artery disease    • Diabetes mellitus (CMS/[facility])    • Mixed hyperlipidemia 7/30/2020     Past Surgical History:   Procedure Laterality Date   • CARDIAC CATHETERIZATION     • CORONARY ARTERY BYPASS GRAFT     • CORONARY STENT PLACEMENT     • DILATATION AND CURETTAGE     • HYSTERECTOMY     • TONSILLECTOMY     • TONSILLECTOMY       Family History   Problem Relation Age of Onset   • Diabetes Mother    • " No Known Problems Father    • No Known Problems Sister    • Heart attack Brother    • Heart disease Brother    • Heart failure Brother    • No Known Problems Maternal Aunt    • No Known Problems Maternal Uncle    • No Known Problems Paternal Aunt    • No Known Problems Paternal Uncle    • No Known Problems Maternal Grandmother    • No Known Problems Maternal Grandfather    • No Known Problems Paternal Grandmother    • No Known Problems Paternal Grandfather    • No Known Problems Other    • Anemia Neg Hx    • Arrhythmia Neg Hx    • Asthma Neg Hx    • Clotting disorder Neg Hx    • Fainting Neg Hx    • Hyperlipidemia Neg Hx    • Hypertension Neg Hx        There is no immunization history on file for this patient.    Office Visit on 08/27/2020   Component Date Value Ref Range Status   • Glucose 08/27/2020 187* 70 - 130 mg/dL Final         Review of Systems   HENT: Negative.    Respiratory: Negative.    Cardiovascular: Negative.    Gastrointestinal: Negative.    Genitourinary: Negative.    Musculoskeletal: Negative.    Skin: Negative.    Neurological: Positive for dizziness and weakness.   Psychiatric/Behavioral: Negative.        Objective   Physical Exam   Constitutional: She is oriented to person, place, and time. She appears well-developed and well-nourished.   Cardiovascular: Normal rate and regular rhythm.   Pulmonary/Chest: Effort normal and breath sounds normal.   Abdominal: Soft. Bowel sounds are normal.   Musculoskeletal: Normal range of motion.   Neurological: She is alert and oriented to person, place, and time.   Skin: Skin is warm and dry.   Psychiatric: She has a normal mood and affect.   Mild dementia       Procedures    Assessment/Plan   Problems Addressed this Visit     None      Visit Diagnoses     Hypotension due to drugs    -  Primary    BMI 25.0-25.9,adult        Type 2 diabetes mellitus with diabetic autonomic neuropathy, with long-term current use of insulin (CMS/[facility])                Current  Outpatient Medications:   •  alendronate (FOSAMAX) 70 MG tablet, Take 1 tablet by mouth Every 7 (Seven) Days., Disp: 4 tablet, Rfl: 6  •  Blood Glucose Monitoring Suppl (BLOOD GLUCOSE MONITOR SYSTEM) w/Device kit, Check blood sugars tid, Disp: 1 each, Rfl: 0  •  carvedilol (COREG) 12.5 MG tablet, Take 12.5 mg by mouth 2 (Two) Times a Day With Meals., Disp: , Rfl:   •  clopidogrel (PLAVIX) 75 MG tablet, Take 75 mg by mouth Daily., Disp: , Rfl:   •  ferrous sulfate 324 MG tablet delayed-release, Take 1 tablet by mouth Daily With Breakfast., Disp: 30 tablet, Rfl: 6  •  glimepiride (AMARYL) 2 MG tablet, Take 1 tablet by mouth Every Morning Before Breakfast., Disp: 60 tablet, Rfl: 2  •  glucose blood test strip, Check blood sugar tid, Disp: 100 each, Rfl: 12  •  Insulin Pen Needle (PEN NEEDLES) 32G X 4 MM misc, 1 each Daily. Use as directed with insulin, Disp: 50 each, Rfl: 5  •  Lancets misc, 1 applicator 3 (Three) Times a Day., Disp: 100 each, Rfl: 11  •  levothyroxine (Synthroid) 50 MCG tablet, Take 1 tablet by mouth Daily., Disp: 30 tablet, Rfl: 2  •  metFORMIN (GLUCOPHAGE) 1000 MG tablet, Take 1,000 mg by mouth Daily., Disp: , Rfl:   •  nitroglycerin (NITROSTAT) 0.4 MG SL tablet, Place 0.4 mg under the tongue Every 5 (Five) Minutes As Needed for Chest Pain. Take no more than 3 doses in 15 minutes., Disp: , Rfl:   •  oxybutynin (DITROPAN) 5 MG tablet, Take 1 tablet by mouth 2 (Two) Times a Day., Disp: 60 tablet, Rfl: 0  •  PARoxetine (PAXIL) 30 MG tablet, Take 1 tablet by mouth Every Morning., Disp: 90 tablet, Rfl: 3  •  rosuvastatin (CRESTOR) 20 MG tablet, Take 40 mg by mouth Daily., Disp: , Rfl:   •  insulin detemir (LEVEMIR) 100 UNIT/ML injection, Inject 20 Units under the skin into the appropriate area as directed Daily., Disp: , Rfl:   •  midodrine (PROAMATINE) 5 MG tablet, Take 1 tablet by mouth 3 (Three) Times a Day Before Meals., Disp: 30 tablet, Rfl: 2

## 2020-09-03 NOTE — PATIENT INSTRUCTIONS
Take Midodrine 5mg as directed  Monitor blood pressure closely try to keep systolic greater than 100  Monitor blood sugars and watch your diet  Keep appointment with cardiology

## 2020-09-08 PROBLEM — E11.9 TYPE 2 DIABETES MELLITUS WITHOUT COMPLICATION, WITHOUT LONG-TERM CURRENT USE OF INSULIN (HCC): Status: ACTIVE | Noted: 2020-09-08

## 2020-09-08 RX ORDER — LANCETS 30 GAUGE
EACH MISCELLANEOUS
Qty: 100 EACH | Refills: 11 | Status: SHIPPED | OUTPATIENT
Start: 2020-09-08

## 2020-09-08 RX ORDER — DIPHENHYDRAMINE HYDROCHLORIDE 25 MG/1
CAPSULE, LIQUID FILLED ORAL
Qty: 1 EACH | Refills: 0 | Status: SHIPPED | OUTPATIENT
Start: 2020-09-08

## 2020-09-14 DIAGNOSIS — E03.9 HYPOTHYROIDISM, UNSPECIFIED TYPE: ICD-10-CM

## 2020-09-14 RX ORDER — ALENDRONATE SODIUM 70 MG/1
70 TABLET ORAL
Qty: 4 TABLET | Refills: 6 | Status: SHIPPED | OUTPATIENT
Start: 2020-09-14 | End: 2020-10-03 | Stop reason: SDUPTHER

## 2020-09-14 RX ORDER — LEVOTHYROXINE SODIUM 0.05 MG/1
50 TABLET ORAL DAILY
Qty: 30 TABLET | Refills: 2 | Status: SHIPPED | OUTPATIENT
Start: 2020-09-14

## 2020-09-16 ENCOUNTER — TELEPHONE (OUTPATIENT)
Dept: FAMILY MEDICINE CLINIC | Facility: CLINIC | Age: 75
End: 2020-09-16

## 2020-09-16 NOTE — TELEPHONE ENCOUNTER
Patient called said that she has had black stool 4 to 5 days now doesn't think its going away. Also mentioned on the voicemail that she has an appointment with a cardiologist but doesn't know when, I looked in her referrals and doesn't have a time and date anywhere

## 2020-09-17 NOTE — TELEPHONE ENCOUNTER
Referral for Trell was made couple of visits ago you may not have been here then need to make sure she gets in to see him  Need occult blood stool on her

## 2020-09-28 ENCOUNTER — OFFICE VISIT (OUTPATIENT)
Dept: CARDIOLOGY | Facility: CLINIC | Age: 75
End: 2020-09-28

## 2020-09-28 VITALS
WEIGHT: 158 LBS | DIASTOLIC BLOOD PRESSURE: 56 MMHG | OXYGEN SATURATION: 98 % | HEART RATE: 75 BPM | BODY MASS INDEX: 25.39 KG/M2 | HEIGHT: 66 IN | SYSTOLIC BLOOD PRESSURE: 104 MMHG

## 2020-09-28 DIAGNOSIS — I95.89 CHRONIC HYPOTENSION: ICD-10-CM

## 2020-09-28 DIAGNOSIS — I25.10 CORONARY ARTERY DISEASE INVOLVING NATIVE CORONARY ARTERY OF NATIVE HEART WITHOUT ANGINA PECTORIS: Primary | ICD-10-CM

## 2020-09-28 DIAGNOSIS — E11.9 TYPE 2 DIABETES MELLITUS WITHOUT COMPLICATION, WITHOUT LONG-TERM CURRENT USE OF INSULIN (HCC): ICD-10-CM

## 2020-09-28 DIAGNOSIS — E78.2 MIXED HYPERLIPIDEMIA: ICD-10-CM

## 2020-09-28 PROCEDURE — 99214 OFFICE O/P EST MOD 30 MIN: CPT | Performed by: INTERNAL MEDICINE

## 2020-09-28 RX ORDER — VITAMIN B COMPLEX
1 CAPSULE ORAL DAILY
COMMUNITY

## 2020-09-28 RX ORDER — ASPIRIN 81 MG/1
81 TABLET ORAL DAILY
COMMUNITY

## 2020-09-28 RX ORDER — OXYBUTYNIN CHLORIDE 5 MG/1
TABLET ORAL
Qty: 60 TABLET | Refills: 0 | Status: SHIPPED | OUTPATIENT
Start: 2020-09-28

## 2020-09-28 RX ORDER — SENNOSIDES 8.6 MG
650 CAPSULE ORAL EVERY 8 HOURS PRN
COMMUNITY

## 2020-09-28 NOTE — PROGRESS NOTES
Date of Office Visit: 2020  Encounter Provider: Dr. Hebert Cai    Place of Service: [facility] CARDIOLOGY [place]  Patient Name: Edin Brown  :1945  Neelima Redd APRN    Chief Complaint   Patient presents with   • Consult   • Coronary Artery Disease  CABG  Status post coronary artery stenting  Diabetes mellitus type 2  Hypotension     History of Present Illness:    I am pleased to see Mrs. Brown in my office today as a follow-up.    As you know, patient is 75-year-old white female whose past medical history significant for hyperlipidemia, diabetes mellitus, CAD, history of coronary artery stenting, CABG, who came to establish her cardiac care with me.    In , patient underwent CABG at [facility].  Patient subsequently moved to [place].  And most of her cardiac care was over there.  Recently she moved to [place] again.  And she wants to establish her cardiac care with me.    In , patient was visiting [place] and had unstable angina.  Patient underwent cardiac catheterization and subsequent stenting was required.  In 2020, she was admitted in [facility] and had non-ST elevation myocardial infarction.  Patient underwent cardiac catheterization and subsequent stenting.  Records of all these procedures are not available to me.    Patient is fairly active.  She lives independently.  Patient denies any symptom of chest pain or tightness or heaviness.  Patient does have mild shortness of breath.  No leg edema noted.  No orthopnea or PND.  No sign or symptom of congestive heart failure.    At present patient is stable from coronary artery standpoint.  No active symptom of coronary insufficiency or angina pectoris.  However her blood pressure is noted to be low.  Patient complained of occasional dizzy spell.  I advised her to monitor the blood pressure at home morning and evening and bring the logbook to me on next visit.  I will see the patient  relatively sooner in 3 months.  If she has persistently low blood pressure, I would consider midodrine.  Current treatment would be continued.        Past Medical History:   Diagnosis Date   • Coronary artery disease    • Diabetes mellitus (CMS/HCC)    • Mixed hyperlipidemia 7/30/2020         Past Surgical History:   Procedure Laterality Date   • CARDIAC CATHETERIZATION     • CORONARY ARTERY BYPASS GRAFT     • CORONARY STENT PLACEMENT     • DILATATION AND CURETTAGE     • HYSTERECTOMY     • TONSILLECTOMY     • TONSILLECTOMY             Current Outpatient Medications:   •  acetaminophen (TYLENOL) 650 MG 8 hr tablet, Take 650 mg by mouth Every 8 (Eight) Hours As Needed for Mild Pain ., Disp: , Rfl:   •  alendronate (FOSAMAX) 70 MG tablet, Take 1 tablet by mouth Every 7 (Seven) Days., Disp: 4 tablet, Rfl: 6  •  aspirin 81 MG EC tablet, Take 81 mg by mouth Daily., Disp: , Rfl:   •  B Complex Vitamins (vitamin b complex) capsule capsule, Take 1 capsule by mouth Daily., Disp: , Rfl:   •  Blood Glucose Monitoring Suppl (BLOOD GLUCOSE MONITOR SYSTEM) w/Device kit, Check BS 3 times a day DX Code E11.9, Disp: 1 each, Rfl: 0  •  carvedilol (COREG) 12.5 MG tablet, Take 12.5 mg by mouth 3 (Three) Times a Day., Disp: , Rfl:   •  Cholecalciferol (Vitamin D-3) 125 MCG (5000 UT) tablet, Take  by mouth., Disp: , Rfl:   •  clopidogrel (PLAVIX) 75 MG tablet, Take 75 mg by mouth Daily., Disp: , Rfl:   •  ferrous sulfate 324 MG tablet delayed-release, Take 1 tablet by mouth Daily With Breakfast., Disp: 30 tablet, Rfl: 6  •  glimepiride (AMARYL) 2 MG tablet, Take 1 tablet by mouth Every Morning Before Breakfast., Disp: 60 tablet, Rfl: 2  •  glucose blood test strip, Check BS 3 times a day DX Code E11.9, Disp: 100 each, Rfl: 12  •  Lancets misc, Check BS 3 times a day DX Code E11.9, Disp: 100 each, Rfl: 11  •  metFORMIN (GLUCOPHAGE) 1000 MG tablet, Take 1,000 mg by mouth Daily With Breakfast., Disp: , Rfl:   •  nitroglycerin (NITROSTAT) 0.4  MG SL tablet, Place 0.4 mg under the tongue Every 5 (Five) Minutes As Needed for Chest Pain. Take no more than 3 doses in 15 minutes., Disp: , Rfl:   •  oxybutynin (DITROPAN) 5 MG tablet, Take 1 tablet by mouth twice daily, Disp: 60 tablet, Rfl: 0  •  PARoxetine (PAXIL) 30 MG tablet, Take 1 tablet by mouth Every Morning., Disp: 90 tablet, Rfl: 3  •  rosuvastatin (CRESTOR) 20 MG tablet, Take 40 mg by mouth Daily., Disp: , Rfl:   •  TURMERIC PO, Take  by mouth Daily., Disp: , Rfl:   •  levothyroxine (Synthroid) 50 MCG tablet, Take 1 tablet by mouth Daily. (Patient not taking: Reported on 2020), Disp: 30 tablet, Rfl: 2      Social History     Socioeconomic History   • Marital status: Unknown     Spouse name: Not on file   • Number of children: Not on file   • Years of education: Not on file   • Highest education level: Not on file   Tobacco Use   • Smoking status: Former Smoker     Quit date: 1993     Years since quittin.1   • Smokeless tobacco: Never Used   Substance and Sexual Activity   • Alcohol use: Never     Frequency: Never   • Drug use: Never   • Sexual activity: Defer         Review of Systems   Constitution: Negative for chills and fever.   HENT: Negative for ear discharge and nosebleeds.    Eyes: Negative for discharge and redness.   Cardiovascular: Negative for chest pain, orthopnea, palpitations, paroxysmal nocturnal dyspnea and syncope.   Respiratory: Positive for shortness of breath. Negative for cough and wheezing.    Endocrine: Negative for heat intolerance.   Skin: Negative for rash.   Musculoskeletal: Positive for arthritis and joint pain. Negative for myalgias.   Gastrointestinal: Negative for abdominal pain, melena, nausea and vomiting.   Genitourinary: Negative for dysuria and hematuria.   Neurological: Negative for dizziness, light-headedness, numbness and tremors.   Psychiatric/Behavioral: Negative for depression. The patient is not nervous/anxious.   "      Procedures    Procedures    No orders to display           Objective:    /56   Pulse 75   Ht 167.6 cm (66\")   Wt 71.7 kg (158 lb)   LMP  (LMP Unknown)   SpO2 98%   BMI 25.50 kg/m²         Constitutional:       Appearance: Well-developed.   Eyes:      General: No scleral icterus.        Right eye: No discharge.   HENT:      Head: Normocephalic and atraumatic.   Neck:      Thyroid: No thyromegaly.      Lymphadenopathy: No cervical adenopathy.   Pulmonary:      Effort: Pulmonary effort is normal. No respiratory distress.      Breath sounds: Normal breath sounds. No wheezing. No rales.   Cardiovascular:      Normal rate. Regular rhythm.      No gallop.   Abdominal:      Tenderness: There is no abdominal tenderness.   Skin:     Findings: No erythema or rash.   Neurological:      Mental Status: Alert and oriented to person, place, and time.             Assessment:       Diagnosis Plan   1. Coronary artery disease involving native coronary artery of native heart without angina pectoris     2. Mixed hyperlipidemia     3. Type 2 diabetes mellitus without complication, without long-term current use of insulin (CMS/[facility])     4. Chronic hypotension              Plan:       Her blood pressure is slightly low.  I advised her to monitor the blood pressure.  Patient is advised to call me if there is very low blood pressure less than 90.  Consider midodrine in future.  I will see the patient in 3 months with the blood pressure readings  "

## 2020-10-01 RX ORDER — CLOPIDOGREL BISULFATE 75 MG/1
75 TABLET ORAL DAILY
Qty: 30 TABLET | Status: CANCELLED | OUTPATIENT
Start: 2020-10-01

## 2020-10-01 RX ORDER — ALENDRONATE SODIUM 70 MG/1
70 TABLET ORAL
Qty: 4 TABLET | Refills: 6 | Status: CANCELLED | OUTPATIENT
Start: 2020-10-01

## 2020-10-01 RX ORDER — CARVEDILOL 12.5 MG/1
12.5 TABLET ORAL 3 TIMES DAILY
Status: CANCELLED | OUTPATIENT
Start: 2020-10-01

## 2020-10-03 RX ORDER — ALENDRONATE SODIUM 70 MG/1
70 TABLET ORAL
Qty: 4 TABLET | Refills: 6 | Status: SHIPPED | OUTPATIENT
Start: 2020-10-03

## 2020-10-07 ENCOUNTER — OFFICE VISIT (OUTPATIENT)
Dept: FAMILY MEDICINE CLINIC | Facility: CLINIC | Age: 75
End: 2020-10-07

## 2020-10-07 VITALS
DIASTOLIC BLOOD PRESSURE: 66 MMHG | HEIGHT: 66 IN | BODY MASS INDEX: 25.07 KG/M2 | SYSTOLIC BLOOD PRESSURE: 112 MMHG | HEART RATE: 87 BPM | WEIGHT: 156 LBS | TEMPERATURE: 98.2 F | OXYGEN SATURATION: 98 %

## 2020-10-07 DIAGNOSIS — Z79.4 TYPE 2 DIABETES MELLITUS WITH DIABETIC AUTONOMIC NEUROPATHY, WITH LONG-TERM CURRENT USE OF INSULIN (HCC): ICD-10-CM

## 2020-10-07 DIAGNOSIS — N39.41 URGE INCONTINENCE: ICD-10-CM

## 2020-10-07 DIAGNOSIS — I95.89 CHRONIC HYPOTENSION: ICD-10-CM

## 2020-10-07 DIAGNOSIS — E11.43 TYPE 2 DIABETES MELLITUS WITH DIABETIC AUTONOMIC NEUROPATHY, WITH LONG-TERM CURRENT USE OF INSULIN (HCC): ICD-10-CM

## 2020-10-07 DIAGNOSIS — E11.9 TYPE 2 DIABETES MELLITUS WITHOUT COMPLICATION, WITHOUT LONG-TERM CURRENT USE OF INSULIN (HCC): ICD-10-CM

## 2020-10-07 DIAGNOSIS — Z23 NEED FOR 23-POLYVALENT PNEUMOCOCCAL POLYSACCHARIDE VACCINE: Primary | ICD-10-CM

## 2020-10-07 PROCEDURE — 90732 PPSV23 VACC 2 YRS+ SUBQ/IM: CPT | Performed by: NURSE PRACTITIONER

## 2020-10-07 PROCEDURE — G0009 ADMIN PNEUMOCOCCAL VACCINE: HCPCS | Performed by: NURSE PRACTITIONER

## 2020-10-07 PROCEDURE — 99214 OFFICE O/P EST MOD 30 MIN: CPT | Performed by: NURSE PRACTITIONER

## 2020-10-07 RX ORDER — SAW/VIT E/SOD SEL/LYC/BETA/PYG 160-100
1 TABLET ORAL DAILY
Qty: 30 CAPSULE | Refills: 6 | Status: SHIPPED | OUTPATIENT
Start: 2020-10-07

## 2020-10-07 RX ORDER — CARVEDILOL 12.5 MG/1
12.5 TABLET ORAL 2 TIMES DAILY WITH MEALS
Qty: 60 TABLET | Refills: 2 | Status: SHIPPED | OUTPATIENT
Start: 2020-10-07

## 2020-10-07 RX ORDER — GLIMEPIRIDE 2 MG/1
2 TABLET ORAL
Qty: 60 TABLET | Refills: 2 | Status: SHIPPED | OUTPATIENT
Start: 2020-10-07

## 2020-10-07 RX ORDER — CLOPIDOGREL BISULFATE 75 MG/1
75 TABLET ORAL DAILY
Qty: 30 TABLET | Refills: 2 | Status: SHIPPED | OUTPATIENT
Start: 2020-10-07

## 2020-10-07 NOTE — PATIENT INSTRUCTIONS
Change Coreg to twice a day  Monitor blood pressure  Report any new falls  Keep appointment with urology  Follow-up visit fasting 3 months

## 2020-10-07 NOTE — PROGRESS NOTES
"    Edin Brown is a 75 y.o. female.     75-year-old white female with history of CAD/CABG/angioplasty, depression, anxiety, type 2 diabetes, hyperlipidemia, kidney stones, hypothyroidism, urinary incontinence who comes in today for follow-up visit  Patient had been having falls due to hypotension I placed her on Midodrine 5mg and pressures have come up well now pressure today 112/66 heart rate 86 she denies any new falls.  I am also reducing her Coreg to twice a day instead of 3 times daily.  Patient is to let me know if she develops any palpitations  She complains of left leg pain then remembered that the last time she fell she hit the left side of her body.  She states is getting better we will then await and see what happens with the pain  Patient states blood sugars are averaging about 130 fasting in the morning  I had placed patient on some bladder spasm medication but she states it did not help so we are going to do a urology referral  Weight is about the same at 156 with a BMI of 25.2  Patient up-to-date on all preventative she is getting a Pneumovax 23 today    Pneumovax 23  Urology referral  Decrease Coreg to twice daily monitor blood pressures at home  Report any new falls  Follow-up 3 months         The following portions of the patient's history were reviewed and updated as appropriate: allergies, current medications, past family history, past medical history, past social history, past surgical history and problem list.    Vitals:    10/07/20 1138   BP: 112/66   BP Location: Right arm   Patient Position: Sitting   Cuff Size: Adult   Pulse: 87   Temp: 98.2 °F (36.8 °C)   TempSrc: Temporal   SpO2: 98%   Weight: 70.8 kg (156 lb)   Height: 167.6 cm (66\")     Body mass index is 25.18 kg/m².    Past Medical History:   Diagnosis Date   • Coronary artery disease    • Diabetes mellitus (CMS/HCC)    • Mixed hyperlipidemia 7/30/2020     Past Surgical History:   Procedure Laterality Date   • CARDIAC " CATHETERIZATION     • CORONARY ARTERY BYPASS GRAFT     • CORONARY STENT PLACEMENT     • DILATATION AND CURETTAGE     • HYSTERECTOMY     • TONSILLECTOMY     • TONSILLECTOMY       Family History   Problem Relation Age of Onset   • Diabetes Mother    • No Known Problems Father    • No Known Problems Sister    • Heart attack Brother    • Heart disease Brother    • Heart failure Brother    • No Known Problems Maternal Aunt    • No Known Problems Maternal Uncle    • No Known Problems Paternal Aunt    • No Known Problems Paternal Uncle    • No Known Problems Maternal Grandmother    • No Known Problems Maternal Grandfather    • No Known Problems Paternal Grandmother    • No Known Problems Paternal Grandfather    • No Known Problems Other    • Anemia Neg Hx    • Arrhythmia Neg Hx    • Asthma Neg Hx    • Clotting disorder Neg Hx    • Fainting Neg Hx    • Hyperlipidemia Neg Hx    • Hypertension Neg Hx      Immunization History   Administered Date(s) Administered   • Pneumococcal Polysaccharide (PPSV23) 10/07/2020   • Shingrix 11/19/2019, 03/08/2020       Office Visit on 08/27/2020   Component Date Value Ref Range Status   • Glucose 08/27/2020 187* 70 - 130 mg/dL Final         Review of Systems   Constitutional: Negative.    HENT: Negative.    Respiratory: Negative.    Cardiovascular: Negative.    Gastrointestinal: Negative.    Genitourinary: Positive for urinary incontinence.   Musculoskeletal:        Left leg pain   Skin: Negative.    Neurological: Negative.    Psychiatric/Behavioral: Negative.        Objective   Physical Exam  Constitutional:       Appearance: Normal appearance.   HENT:      Head: Normocephalic.   Cardiovascular:      Rate and Rhythm: Normal rate and regular rhythm.      Pulses: Normal pulses.      Heart sounds: Normal heart sounds.   Pulmonary:      Effort: Pulmonary effort is normal.      Breath sounds: Normal breath sounds.   Abdominal:      General: Bowel sounds are normal.   Musculoskeletal: Normal  range of motion.   Skin:     General: Skin is warm and dry.   Neurological:      General: No focal deficit present.      Mental Status: She is alert and oriented to person, place, and time.   Psychiatric:         Mood and Affect: Mood normal.         Behavior: Behavior normal.         Procedures    Assessment/Plan   Edin was seen today for leg pain.    Diagnoses and all orders for this visit:    Need for 23-polyvalent pneumococcal polysaccharide vaccine  -     Pneumococcal Polysaccharide Vaccine 23-Valent (PPSV23) Greater Than or Equal To 1yo Subcutaneous / IM    Type 2 diabetes mellitus with diabetic autonomic neuropathy, with long-term current use of insulin (CMS/[facility])  -     glimepiride (AMARYL) 2 MG tablet; Take 1 tablet by mouth Every Morning Before Breakfast.    Urge incontinence  -     Ambulatory Referral to Urology    Chronic hypotension    Type 2 diabetes mellitus without complication, without long-term current use of insulin (CMS/[facility])    BMI 25.0-25.9,adult    Other orders  -     Probiotic Product (Probiotic & Acidophilus Ex St) capsule; Take 1 tablet by mouth Daily.  -     clopidogrel (PLAVIX) 75 MG tablet; Take 1 tablet by mouth Daily.  -     carvedilol (COREG) 12.5 MG tablet; Take 1 tablet by mouth 2 (Two) Times a Day With Meals.          Current Outpatient Medications:   •  acetaminophen (TYLENOL) 650 MG 8 hr tablet, Take 650 mg by mouth Every 8 (Eight) Hours As Needed for Mild Pain ., Disp: , Rfl:   •  alendronate (FOSAMAX) 70 MG tablet, Take 1 tablet by mouth Every 7 (Seven) Days., Disp: 4 tablet, Rfl: 6  •  aspirin 81 MG EC tablet, Take 81 mg by mouth Daily., Disp: , Rfl:   •  B Complex Vitamins (vitamin b complex) capsule capsule, Take 1 capsule by mouth Daily., Disp: , Rfl:   •  Blood Glucose Monitoring Suppl (BLOOD GLUCOSE MONITOR SYSTEM) w/Device kit, Check BS 3 times a day DX Code E11.9, Disp: 1 each, Rfl: 0  •  carvedilol (COREG) 12.5 MG tablet, Take 1 tablet by mouth 2 (Two) Times a Day With  Meals., Disp: 60 tablet, Rfl: 2  •  Cholecalciferol (Vitamin D-3) 125 MCG (5000 UT) tablet, Take  by mouth., Disp: , Rfl:   •  clopidogrel (PLAVIX) 75 MG tablet, Take 1 tablet by mouth Daily., Disp: 30 tablet, Rfl: 2  •  ferrous sulfate 324 MG tablet delayed-release, Take 1 tablet by mouth Daily With Breakfast., Disp: 30 tablet, Rfl: 6  •  glimepiride (AMARYL) 2 MG tablet, Take 1 tablet by mouth Every Morning Before Breakfast., Disp: 60 tablet, Rfl: 2  •  glucose blood test strip, Check BS 3 times a day DX Code E11.9, Disp: 100 each, Rfl: 12  •  Lancets misc, Check BS 3 times a day DX Code E11.9, Disp: 100 each, Rfl: 11  •  levothyroxine (Synthroid) 50 MCG tablet, Take 1 tablet by mouth Daily., Disp: 30 tablet, Rfl: 2  •  metFORMIN (GLUCOPHAGE) 1000 MG tablet, Take 1,000 mg by mouth Daily With Breakfast., Disp: , Rfl:   •  nitroglycerin (NITROSTAT) 0.4 MG SL tablet, Place 0.4 mg under the tongue Every 5 (Five) Minutes As Needed for Chest Pain. Take no more than 3 doses in 15 minutes., Disp: , Rfl:   •  oxybutynin (DITROPAN) 5 MG tablet, Take 1 tablet by mouth twice daily, Disp: 60 tablet, Rfl: 0  •  PARoxetine (PAXIL) 30 MG tablet, Take 1 tablet by mouth Every Morning., Disp: 90 tablet, Rfl: 3  •  rosuvastatin (CRESTOR) 20 MG tablet, Take 40 mg by mouth Daily., Disp: , Rfl:   •  TURMERIC PO, Take  by mouth Daily., Disp: , Rfl:   •  Probiotic Product (Probiotic & Acidophilus Ex St) capsule, Take 1 tablet by mouth Daily., Disp: 30 capsule, Rfl: 6

## 2020-10-23 ENCOUNTER — TELEPHONE (OUTPATIENT)
Dept: FAMILY MEDICINE CLINIC | Facility: CLINIC | Age: 75
End: 2020-10-23

## 2020-10-23 NOTE — TELEPHONE ENCOUNTER
Zion called and wanted info on pt.  She is not our Hippa but on another offices.  Told her I could not give out any info on pt.  SG